# Patient Record
Sex: FEMALE | Race: ASIAN | NOT HISPANIC OR LATINO | URBAN - METROPOLITAN AREA
[De-identification: names, ages, dates, MRNs, and addresses within clinical notes are randomized per-mention and may not be internally consistent; named-entity substitution may affect disease eponyms.]

---

## 2018-10-03 ENCOUNTER — IMPORTED ENCOUNTER (OUTPATIENT)
Dept: URBAN - METROPOLITAN AREA CLINIC 9 | Facility: CLINIC | Age: 78
End: 2018-10-03

## 2018-10-10 ENCOUNTER — IMPORTED ENCOUNTER (OUTPATIENT)
Dept: URBAN - METROPOLITAN AREA CLINIC 9 | Facility: CLINIC | Age: 78
End: 2018-10-10

## 2018-10-22 ENCOUNTER — IMPORTED ENCOUNTER (OUTPATIENT)
Dept: URBAN - METROPOLITAN AREA CLINIC 9 | Facility: CLINIC | Age: 78
End: 2018-10-22

## 2018-11-21 ENCOUNTER — IMPORTED ENCOUNTER (OUTPATIENT)
Dept: URBAN - METROPOLITAN AREA CLINIC 9 | Facility: CLINIC | Age: 78
End: 2018-11-21

## 2019-07-16 ENCOUNTER — IMPORTED ENCOUNTER (OUTPATIENT)
Dept: URBAN - METROPOLITAN AREA CLINIC 9 | Facility: CLINIC | Age: 79
End: 2019-07-16

## 2019-09-18 ENCOUNTER — IMPORTED ENCOUNTER (OUTPATIENT)
Dept: URBAN - METROPOLITAN AREA CLINIC 9 | Facility: CLINIC | Age: 79
End: 2019-09-18

## 2019-10-18 ENCOUNTER — IMPORTED ENCOUNTER (OUTPATIENT)
Dept: URBAN - METROPOLITAN AREA CLINIC 9 | Facility: CLINIC | Age: 79
End: 2019-10-18

## 2019-11-22 ENCOUNTER — IMPORTED ENCOUNTER (OUTPATIENT)
Dept: URBAN - METROPOLITAN AREA CLINIC 9 | Facility: CLINIC | Age: 79
End: 2019-11-22

## 2020-03-24 ENCOUNTER — IMPORTED ENCOUNTER (OUTPATIENT)
Dept: URBAN - METROPOLITAN AREA CLINIC 9 | Facility: CLINIC | Age: 80
End: 2020-03-24

## 2020-10-26 ENCOUNTER — IMPORTED ENCOUNTER (OUTPATIENT)
Dept: URBAN - METROPOLITAN AREA CLINIC 9 | Facility: CLINIC | Age: 80
End: 2020-10-26

## 2021-05-07 ENCOUNTER — IMPORTED ENCOUNTER (OUTPATIENT)
Dept: URBAN - METROPOLITAN AREA CLINIC 9 | Facility: CLINIC | Age: 81
End: 2021-05-07

## 2021-06-21 NOTE — PATIENT DISCUSSION

## 2021-06-21 NOTE — PATIENT DISCUSSION
We reviewed the signs and symptoms of retinal tear/retinal detachment and the importance of prompt evaluation should there be increasing floaters, new flashing lights, or decreasing peripheral vision in either eye at any time.  No RT/RD.

## 2021-07-06 ENCOUNTER — IMPORTED ENCOUNTER (OUTPATIENT)
Dept: URBAN - METROPOLITAN AREA CLINIC 9 | Facility: CLINIC | Age: 81
End: 2021-07-06

## 2021-07-27 ENCOUNTER — IMPORTED ENCOUNTER (OUTPATIENT)
Dept: URBAN - METROPOLITAN AREA CLINIC 9 | Facility: CLINIC | Age: 81
End: 2021-07-27

## 2021-09-07 ENCOUNTER — IMPORTED ENCOUNTER (OUTPATIENT)
Dept: URBAN - METROPOLITAN AREA CLINIC 9 | Facility: CLINIC | Age: 81
End: 2021-09-07

## 2021-09-07 PROBLEM — H04.213: Noted: 2021-09-07

## 2021-09-22 NOTE — PATIENT DISCUSSION
Visually significant, will plan for PPV after CE which will allow for more complete PPV. Rec Monofocal Lens.

## 2021-10-02 ASSESSMENT — KERATOMETRY
OD_AXISANGLE_DEGREES: 58
OD_K2POWER_DIOPTERS: 45.5
OS_K2POWER_DIOPTERS: 45
OS_K2POWER_DIOPTERS: 45.25
OD_K1POWER_DIOPTERS: 44.75
OD_K2POWER_DIOPTERS: 45.5
OS_AXISANGLE_DEGREES: 88
OS_K1POWER_DIOPTERS: 44.5
OD_AXISANGLE2_DEGREES: 147
OD_AXISANGLE_DEGREES: 57
OD_AXISANGLE2_DEGREES: 148
OS_AXISANGLE2_DEGREES: 14
OS_AXISANGLE2_DEGREES: 178
OD_K1POWER_DIOPTERS: 44.5
OS_K1POWER_DIOPTERS: 44.25
OS_AXISANGLE_DEGREES: 104

## 2021-10-02 ASSESSMENT — VISUAL ACUITY
OS_SC: 20/40 SN
OS_CC: 20/30 -2 SN
OS_SC: 20/40 SN
OS_CC: 20/40 + SN
OD_SC: 20/50 + SN
OD_SC: 20/40 - SN
OS_CC: 20/40 SN
OS_SC: 20/40 SN
OS_SC: 20/50 + SN
OS_SC: 20/40 SN
OS_SC: 20/40 - SN
OD_SC: 20/40 SN
OD_CC: 20/30 - SN
OS_SC: 20/40 + SN
OD_SC: 20/50 + SN
OD_SC: 20/50 + SN
OS_SC: 20/100 - SN
OD_SC: 20/50 -2 SN
OS_SC: 20/40 - SN
OD_SC: 20/50 SN
OD_CC: 20/40 SN
OS_CC: 20/40 SN
OD_SC: 20/50 + SN
OD_SC: 20/40 + SN
OD_SC: 20/30 - SN
OS_SC: 20/40 -2 SN
OD_CC: 20/30 -2 SN

## 2021-10-02 ASSESSMENT — TONOMETRY
OS_IOP_MMHG: 12
OD_IOP_MMHG: 10
OS_IOP_MMHG: 12
OD_IOP_MMHG: 19
OS_IOP_MMHG: 13
OD_IOP_MMHG: 12
OD_IOP_MMHG: 11
OD_IOP_MMHG: 12
OD_IOP_MMHG: 15
OS_IOP_MMHG: 21
OS_IOP_MMHG: 12
OD_IOP_MMHG: 12
OD_IOP_MMHG: 13
OS_IOP_MMHG: 12
OS_IOP_MMHG: 14
OS_IOP_MMHG: 11
OS_IOP_MMHG: 10

## 2021-10-02 ASSESSMENT — PACHYMETRY
OS_CT_UM: 547.0
OD_CT_UM: 547.0

## 2021-10-19 ENCOUNTER — FOLLOW UP (OUTPATIENT)
Dept: URBAN - METROPOLITAN AREA CLINIC 4 | Facility: CLINIC | Age: 81
End: 2021-10-19

## 2021-10-19 DIAGNOSIS — H04.213: ICD-10-CM

## 2021-10-19 DIAGNOSIS — H04.123: ICD-10-CM

## 2021-10-19 PROCEDURE — 92012 INTRM OPH EXAM EST PATIENT: CPT

## 2021-10-19 ASSESSMENT — VISUAL ACUITY
OD_SC: 20/50
OS_SC: 20/40-1

## 2021-10-20 ENCOUNTER — ESTABLISHED PATIENT (OUTPATIENT)
Dept: URBAN - METROPOLITAN AREA CLINIC 4 | Facility: CLINIC | Age: 81
End: 2021-10-20

## 2021-10-20 DIAGNOSIS — H01.024: ICD-10-CM

## 2021-10-20 DIAGNOSIS — H01.021: ICD-10-CM

## 2021-10-20 DIAGNOSIS — H40.1231: ICD-10-CM

## 2021-10-20 DIAGNOSIS — H02.831: ICD-10-CM

## 2021-10-20 DIAGNOSIS — H02.834: ICD-10-CM

## 2021-10-20 DIAGNOSIS — H26.493: ICD-10-CM

## 2021-10-20 DIAGNOSIS — E11.9: ICD-10-CM

## 2021-10-20 DIAGNOSIS — H04.123: ICD-10-CM

## 2021-10-20 PROCEDURE — 92015 DETERMINE REFRACTIVE STATE: CPT

## 2021-10-20 PROCEDURE — 92133 CPTRZD OPH DX IMG PST SGM ON: CPT

## 2021-10-20 PROCEDURE — 92014 COMPRE OPH EXAM EST PT 1/>: CPT

## 2021-10-20 ASSESSMENT — TONOMETRY
OS_IOP_MMHG: 10
OD_IOP_MMHG: 12

## 2021-10-20 ASSESSMENT — KERATOMETRY
OD_AXISANGLE2_DEGREES: 150
OS_AXISANGLE2_DEGREES: 172
OD_K2POWER_DIOPTERS: 45.5
OS_K2POWER_DIOPTERS: 45.75
OS_K1POWER_DIOPTERS: 45.5
OD_K1POWER_DIOPTERS: 44.75
OS_AXISANGLE_DEGREES: 82
OD_AXISANGLE_DEGREES: 60

## 2021-10-20 ASSESSMENT — VISUAL ACUITY
OD_SC: 20/30-2
OS_SC: 20/40-1
OU_SC: 20/30-2

## 2021-10-28 ENCOUNTER — ESTABLISHED PATIENT (OUTPATIENT)
Dept: URBAN - METROPOLITAN AREA CLINIC 4 | Facility: CLINIC | Age: 81
End: 2021-10-28

## 2021-10-28 DIAGNOSIS — H40.1230: ICD-10-CM

## 2021-10-28 PROCEDURE — 92020 GONIOSCOPY: CPT

## 2021-10-28 PROCEDURE — 99213 OFFICE O/P EST LOW 20 MIN: CPT

## 2021-10-28 ASSESSMENT — VISUAL ACUITY
OS_SC: 20/40
OD_SC: 20/40

## 2021-10-28 ASSESSMENT — TONOMETRY
OS_IOP_MMHG: 14
OD_IOP_MMHG: 13

## 2021-11-17 NOTE — PATIENT DISCUSSION
After discussion of risks, benefits, and alternatives, including loss of vision, blindness, death, RD, &need for additional surgery and/or retinal detachment, patient elects Retinal surgery.

## 2021-11-17 NOTE — PATIENT DISCUSSION
The membrane is visually significant. Interring with ADL's including driving & reading,  Patient c/o distortion, interfering with ADL's. Discussed R/B/A's of surgery, risks including loss of vision or blindness, Patient elects to having surgery,  Will plan for PPV/MP 12/09/21, 1 day PO 12/10/21 @ 7:45, H & P , gtts called in-start Pred QID for inflammation prior to surgery, Ofloxacin QID day after sx.

## 2021-11-22 NOTE — PATIENT DISCUSSION
The membrane is visually significant.  Patient c/o distortion, interfering with ADL's. Discussed R/B/A's of surgery, risks including loss of vision or blindness, Patient elects to having surgery,  Will plan for PPV/MP 12/09/21, 1 day PO 12/10/21 @ 7:45, H & P , gtts called in-start Pred QID for inflammation prior to surgery, Ofloxacin QID day after sx.

## 2021-12-09 NOTE — PATIENT DISCUSSION
Post-op instructions given. Discussed drops Continue Pred QID &  Erythromycin Ointment QHS(sample given), positioning , no strenuous activity and eye protection. Call immediately if eye pain or loss of vision.

## 2021-12-09 NOTE — PATIENT DISCUSSION
Avoid exposure of the eye to non-sterile fluid such as shower/bath water, No rubbing or touching eyes.

## 2021-12-10 NOTE — PATIENT DISCUSSION
Post-op instructions given. Discussed drops Continue Pred QID & Start Ofloxacin QID & erythomycin QHS(sample given), positioning , no strenuous activity and eye protection. Call immediately if eye pain or loss of vision.

## 2021-12-14 ENCOUNTER — ESTABLISHED PATIENT (OUTPATIENT)
Dept: URBAN - METROPOLITAN AREA CLINIC 4 | Facility: CLINIC | Age: 81
End: 2021-12-14

## 2021-12-14 DIAGNOSIS — H04.123: ICD-10-CM

## 2021-12-14 PROCEDURE — 99213 OFFICE O/P EST LOW 20 MIN: CPT

## 2021-12-14 ASSESSMENT — VISUAL ACUITY
OS_SC: 20/40
OD_SC: 20/50

## 2021-12-14 ASSESSMENT — TONOMETRY
OD_IOP_MMHG: 14
OS_IOP_MMHG: 13

## 2021-12-15 NOTE — PATIENT DISCUSSION
Post-op instructions given. Discussed drops Continue Pred QID & D/C Ofloxacin QID & continue erythomycin QHS(sample given), positioning , no strenuous activity and eye protection. Call immediately if eye pain or loss of vision.

## 2022-01-14 ENCOUNTER — CLINIC PROCEDURE ONLY (OUTPATIENT)
Dept: URBAN - METROPOLITAN AREA CLINIC 4 | Facility: CLINIC | Age: 82
End: 2022-01-14

## 2022-01-14 DIAGNOSIS — H40.1230: ICD-10-CM

## 2022-01-14 PROCEDURE — 65855 TRABECULOPLASTY LASER SURG: CPT

## 2022-01-14 ASSESSMENT — TONOMETRY: OD_IOP_MMHG: 12

## 2022-01-28 NOTE — PATIENT DISCUSSION
Post Op Week 7 : No eye Pain, Doing well, retina attached, good IOP with no signs of endophthalmitis.

## 2022-01-28 NOTE — PATIENT DISCUSSION
Post-op instructions given. Discussed drops Continue Pred QID & D/C Erythromycin Ointment, no strenuous activity and eye protection. Call immediately if eye pain or loss of vision.

## 2022-02-28 NOTE — PATIENT DISCUSSION
Post-op instructions given. Discussed drops start to taper Pred TID for 2 weeks, BID for 2 weeks, Qday for 2 weeks and then stop, no strenuous activity and eye protection. Call immediately if eye pain or loss of vision.

## 2022-02-28 NOTE — PATIENT DISCUSSION
Post Op Week 10 : No eye Pain, Doing well, retina attached, good IOP with no signs of endophthalmitis.

## 2022-03-04 ENCOUNTER — CLINIC PROCEDURE ONLY (OUTPATIENT)
Dept: URBAN - METROPOLITAN AREA CLINIC 4 | Facility: CLINIC | Age: 82
End: 2022-03-04

## 2022-03-04 DIAGNOSIS — H40.1230: ICD-10-CM

## 2022-03-04 PROCEDURE — 65855 TRABECULOPLASTY LASER SURG: CPT

## 2022-03-04 ASSESSMENT — TONOMETRY
OD_IOP_MMHG: 9
OS_IOP_MMHG: 6

## 2022-03-11 NOTE — PATIENT DISCUSSION
INDICATION: Chest pain and abnormal stress test.



TIME OF EXAM: 8:29 AM



Correlation is made with prior chest from 12/19/2021.



FINDINGS: The heart size is normal. The pulmonary vascularity is

unremarkable. The lungs are clear. No infiltrate, effusion or

pneumothorax is detected.



IMPRESSION: No acute cardiopulmonary process is detected.



Dictated by: 



  Dictated on workstation # [id number] Monofocal lens OD, The patient is cleared for cataract surgery.

## 2022-03-23 NOTE — PATIENT DISCUSSION
Avoid exposure of the eye to non-sterile fluid such as shower/bath water, No rubbing or touching eyes. Traveling alone

## 2022-03-25 ENCOUNTER — CLINIC PROCEDURE ONLY (OUTPATIENT)
Dept: URBAN - METROPOLITAN AREA CLINIC 4 | Facility: CLINIC | Age: 82
End: 2022-03-25

## 2022-03-25 DIAGNOSIS — H40.1230: ICD-10-CM

## 2022-03-25 PROCEDURE — 99213 OFFICE O/P EST LOW 20 MIN: CPT

## 2022-03-25 ASSESSMENT — TONOMETRY
OD_IOP_MMHG: 11
OS_IOP_MMHG: 11

## 2022-03-25 ASSESSMENT — VISUAL ACUITY
OS_SC: 20/40
OD_SC: 20/50-2

## 2022-04-22 ENCOUNTER — ESTABLISHED PATIENT (OUTPATIENT)
Dept: URBAN - METROPOLITAN AREA CLINIC 4 | Facility: CLINIC | Age: 82
End: 2022-04-22

## 2022-04-22 DIAGNOSIS — H40.1230: ICD-10-CM

## 2022-04-22 PROCEDURE — 99213 OFFICE O/P EST LOW 20 MIN: CPT

## 2022-04-22 PROCEDURE — 92083 EXTENDED VISUAL FIELD XM: CPT

## 2022-04-22 ASSESSMENT — VISUAL ACUITY
OS_CC: 20/40
OU_CC: 20/25
OD_CC: 20/25-1

## 2022-04-22 ASSESSMENT — TONOMETRY
OS_IOP_MMHG: 12
OD_IOP_MMHG: 12

## 2022-06-06 NOTE — PROCEDURE NOTE: CLINICAL
PROCEDURE NOTE: Removal of Corneal FB at Slit Lamp Suture removal OD. Diagnosis: Attention to Surgical Dressings and Sutures. Anesthesia: Topical. After the risks, benefits, and alternatives to the procedure were explained to the patient, informed consent was obtained. Corneal foreign body was removed using a 25 gauge needle at the slit lamp. Patient tolerated procedure well. There were no complications. Post-op instructions given. [facility] Labs/Imaging Studies/Medications

## 2022-06-24 ENCOUNTER — ESTABLISHED PATIENT (OUTPATIENT)
Dept: URBAN - METROPOLITAN AREA CLINIC 4 | Facility: CLINIC | Age: 82
End: 2022-06-24

## 2022-06-24 DIAGNOSIS — H40.1230: ICD-10-CM

## 2022-06-24 PROCEDURE — 99213 OFFICE O/P EST LOW 20 MIN: CPT

## 2022-06-24 PROCEDURE — 92020 GONIOSCOPY: CPT

## 2022-06-24 ASSESSMENT — TONOMETRY
OS_IOP_MMHG: 17
OD_IOP_MMHG: 15

## 2022-06-24 ASSESSMENT — VISUAL ACUITY
OD_SC: 20/40
OS_SC: 20/40

## 2022-09-07 NOTE — PATIENT DISCUSSION
Advised patient to follow up with primary care physician for optimal control of modifiable cardiovascular risk factors. Please call Bill,  His insurance denied the generic nexium medication.  I had to change to the generic protonix.   Rufino Marques MD  Family Medicine

## 2022-09-22 ENCOUNTER — ESTABLISHED PATIENT (OUTPATIENT)
Dept: URBAN - METROPOLITAN AREA CLINIC 4 | Facility: CLINIC | Age: 82
End: 2022-09-22

## 2022-09-22 DIAGNOSIS — H40.1123: ICD-10-CM

## 2022-09-22 DIAGNOSIS — H40.1210: ICD-10-CM

## 2022-09-22 PROCEDURE — 99213 OFFICE O/P EST LOW 20 MIN: CPT

## 2022-09-22 PROCEDURE — 92083 EXTENDED VISUAL FIELD XM: CPT

## 2022-09-22 PROCEDURE — 92133 CPTRZD OPH DX IMG PST SGM ON: CPT

## 2022-09-22 ASSESSMENT — VISUAL ACUITY
OD_SC: 20/40
OS_SC: 20/30-2

## 2022-09-22 ASSESSMENT — TONOMETRY
OS_IOP_MMHG: 16
OD_IOP_MMHG: 17

## 2022-10-19 NOTE — PROCEDURE NOTE: SURGICAL
<p>Prior to commencing surgery patient identification, surgical procedure, site, and side were confirmed by Dr. Santino Mckee. Following topical proparacaine anesthesia, the patient was positioned at the YAG laser, a contact lens coupled to the cornea with methylcellulose and an axial posterior capsulotomy performed without complication using 2.9 Mj x 34. Excess methylcellulose was washed from the eye, one drop of Alphagan was instilled and the patient returned to the holding area having tolerated the procedure well and without complication. </p><p> [id number]</p>

## 2023-01-16 ENCOUNTER — ESTABLISHED PATIENT (OUTPATIENT)
Dept: URBAN - METROPOLITAN AREA CLINIC 4 | Facility: CLINIC | Age: 83
End: 2023-01-16

## 2023-01-16 VITALS — HEART RATE: 69 BPM | DIASTOLIC BLOOD PRESSURE: 73 MMHG | HEIGHT: 55 IN | SYSTOLIC BLOOD PRESSURE: 152 MMHG

## 2023-01-16 DIAGNOSIS — H40.1123: ICD-10-CM

## 2023-01-16 DIAGNOSIS — H40.1210: ICD-10-CM

## 2023-01-16 PROCEDURE — 99214 OFFICE O/P EST MOD 30 MIN: CPT

## 2023-01-16 ASSESSMENT — VISUAL ACUITY
OS_SC: 20/40
OD_SC: 20/40

## 2023-01-16 ASSESSMENT — TONOMETRY
OD_IOP_MMHG: 13
OS_IOP_MMHG: 14

## 2023-01-25 ENCOUNTER — POST-OP (OUTPATIENT)
Dept: URBAN - METROPOLITAN AREA CLINIC 4 | Facility: CLINIC | Age: 83
End: 2023-01-25

## 2023-01-25 DIAGNOSIS — Z98.890: ICD-10-CM

## 2023-01-25 DIAGNOSIS — H40.1123: ICD-10-CM

## 2023-01-25 DIAGNOSIS — H40.1210: ICD-10-CM

## 2023-01-25 PROCEDURE — 99024 POSTOP FOLLOW-UP VISIT: CPT

## 2023-01-25 ASSESSMENT — VISUAL ACUITY
OS_SC: CF 3FT
OD_SC: 20/50+2

## 2023-01-25 ASSESSMENT — TONOMETRY
OD_IOP_MMHG: 9
OS_IOP_MMHG: 7

## 2024-01-01 NOTE — PATIENT DISCUSSION
Indications, risks, benefits and alternatives to YAG capsulotomy discussed with patient. Questions answered. Educational handout given. I have personally seen and examined the patient. I have collaborated with and supervised the
